# Patient Record
Sex: FEMALE | Race: WHITE | URBAN - METROPOLITAN AREA
[De-identification: names, ages, dates, MRNs, and addresses within clinical notes are randomized per-mention and may not be internally consistent; named-entity substitution may affect disease eponyms.]

---
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
## 2023-03-03 ENCOUNTER — OUTPATIENT (OUTPATIENT)
Dept: OUTPATIENT SERVICES | Facility: HOSPITAL | Age: 18
LOS: 1 days | End: 2023-03-03
Payer: COMMERCIAL

## 2023-03-03 ENCOUNTER — APPOINTMENT (OUTPATIENT)
Dept: FAMILY MEDICINE | Facility: HOSPITAL | Age: 18
End: 2023-03-03

## 2023-03-03 VITALS
RESPIRATION RATE: 16 BRPM | WEIGHT: 142 LBS | OXYGEN SATURATION: 96 % | SYSTOLIC BLOOD PRESSURE: 117 MMHG | HEART RATE: 79 BPM | DIASTOLIC BLOOD PRESSURE: 72 MMHG | TEMPERATURE: 97.9 F

## 2023-03-03 DIAGNOSIS — Z11.1 ENCOUNTER FOR SCREENING FOR RESPIRATORY TUBERCULOSIS: ICD-10-CM

## 2023-03-03 DIAGNOSIS — Z00.00 ENCOUNTER FOR GENERAL ADULT MEDICAL EXAMINATION WITHOUT ABNORMAL FINDINGS: ICD-10-CM

## 2023-03-03 DIAGNOSIS — Z00.00 ENCOUNTER FOR GENERAL ADULT MEDICAL EXAMINATION W/OUT ABNORMAL FINDINGS: ICD-10-CM

## 2023-03-03 DIAGNOSIS — N92.6 IRREGULAR MENSTRUATION, UNSPECIFIED: ICD-10-CM

## 2023-03-03 DIAGNOSIS — Z23 ENCOUNTER FOR IMMUNIZATION: ICD-10-CM

## 2023-03-05 NOTE — ASSESSMENT
[FreeTextEntry1] : # Health maintenance and physical\par - Completely benign exam\par - Can consider basic blood work CBC, CMP, Lipid profile at a later date\par - Work form filled out \par - Gardisil and flu shot offered but pt refused at this time\par \par # PPD screening \par - PPD placed (Lot # [id number]... Exp date sept 26, 2024)\par - Return in 48 to 72 hrs to have it read \par \par # Irregular menstrual bleeding \par - Asked to keep a menstrual diary \par \par \par Case d/w Dr. Howard

## 2023-03-05 NOTE — HISTORY OF PRESENT ILLNESS
[FreeTextEntry1] : New patient physical for work  [de-identified] : 17 yr old no significant PMH presenting for a physical. 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 Needs PPD and a physical.\par Constitutional: No fevers, chills, or sweats.\par Cardiac: No chest pain, exertional dyspnea, orthopnea\par Respiratory: No shortness of breath, no cough\par GI: No abdominal pain, no N/V/D\par : Irregular menstrual bleed\par Neuro: No headaches, no neck pain/stiffness, no numbness\par All other systems reviewed and are negative unless otherwise stated in the HPI.\par

## 2023-03-07 DIAGNOSIS — N92.6 IRREGULAR MENSTRUATION, UNSPECIFIED: ICD-10-CM

## 2023-04-23 ENCOUNTER — NON-APPOINTMENT (OUTPATIENT)
Age: 18
End: 2023-04-23